# Patient Record
Sex: FEMALE | Race: BLACK OR AFRICAN AMERICAN | NOT HISPANIC OR LATINO | URBAN - METROPOLITAN AREA
[De-identification: names, ages, dates, MRNs, and addresses within clinical notes are randomized per-mention and may not be internally consistent; named-entity substitution may affect disease eponyms.]

---

## 2020-06-19 ENCOUNTER — INPATIENT HOSPITAL (OUTPATIENT)
Dept: URBAN - METROPOLITAN AREA HOSPITAL 93 | Facility: HOSPITAL | Age: 85
End: 2020-06-19
Payer: COMMERCIAL

## 2020-06-19 DIAGNOSIS — D62 ACUTE POSTHEMORRHAGIC ANEMIA: ICD-10-CM

## 2020-06-19 DIAGNOSIS — R41.81 AGE-RELATED COGNITIVE DECLINE: ICD-10-CM

## 2020-06-19 DIAGNOSIS — K92.1 MELENA: ICD-10-CM

## 2020-06-19 DIAGNOSIS — Z79.01 LONG TERM (CURRENT) USE OF ANTICOAGULANTS: ICD-10-CM

## 2020-06-19 DIAGNOSIS — R93.5 ABNORMAL FINDINGS ON DIAGNOSTIC IMAGING OF OTHER ABDOMINAL R: ICD-10-CM

## 2020-06-19 PROCEDURE — 99223 1ST HOSP IP/OBS HIGH 75: CPT | Performed by: INTERNAL MEDICINE

## 2020-06-20 ENCOUNTER — INPATIENT HOSPITAL (OUTPATIENT)
Dept: URBAN - METROPOLITAN AREA HOSPITAL 93 | Facility: HOSPITAL | Age: 85
End: 2020-06-20
Payer: COMMERCIAL

## 2020-06-20 DIAGNOSIS — Z79.01 LONG TERM (CURRENT) USE OF ANTICOAGULANTS: ICD-10-CM

## 2020-06-20 DIAGNOSIS — K92.1 MELENA: ICD-10-CM

## 2020-06-20 DIAGNOSIS — D62 ACUTE POSTHEMORRHAGIC ANEMIA: ICD-10-CM

## 2020-06-20 DIAGNOSIS — R93.5 ABNORMAL FINDINGS ON DIAGNOSTIC IMAGING OF OTHER ABDOMINAL R: ICD-10-CM

## 2020-06-20 DIAGNOSIS — K25.4 CHRONIC OR UNSPECIFIED GASTRIC ULCER WITH HEMORRHAGE: ICD-10-CM

## 2020-06-20 DIAGNOSIS — R41.81 AGE-RELATED COGNITIVE DECLINE: ICD-10-CM

## 2020-06-20 PROCEDURE — 99231 SBSQ HOSP IP/OBS SF/LOW 25: CPT | Mod: 25 | Performed by: INTERNAL MEDICINE

## 2020-06-20 PROCEDURE — 43255 EGD CONTROL BLEEDING ANY: CPT | Mod: 59 | Performed by: INTERNAL MEDICINE

## 2020-06-20 PROCEDURE — 43239 EGD BIOPSY SINGLE/MULTIPLE: CPT | Performed by: INTERNAL MEDICINE

## 2020-06-21 ENCOUNTER — INPATIENT HOSPITAL (OUTPATIENT)
Dept: URBAN - METROPOLITAN AREA HOSPITAL 93 | Facility: HOSPITAL | Age: 85
End: 2020-06-21
Payer: COMMERCIAL

## 2020-06-21 DIAGNOSIS — Z79.01 LONG TERM (CURRENT) USE OF ANTICOAGULANTS: ICD-10-CM

## 2020-06-21 DIAGNOSIS — K92.1 MELENA: ICD-10-CM

## 2020-06-21 DIAGNOSIS — K25.4 CHRONIC OR UNSPECIFIED GASTRIC ULCER WITH HEMORRHAGE: ICD-10-CM

## 2020-06-21 DIAGNOSIS — R41.81 AGE-RELATED COGNITIVE DECLINE: ICD-10-CM

## 2020-06-21 DIAGNOSIS — D62 ACUTE POSTHEMORRHAGIC ANEMIA: ICD-10-CM

## 2020-06-21 PROCEDURE — 99231 SBSQ HOSP IP/OBS SF/LOW 25: CPT | Performed by: INTERNAL MEDICINE

## 2020-08-03 ENCOUNTER — OFFICE (OUTPATIENT)
Dept: URBAN - METROPOLITAN AREA CLINIC 10 | Facility: CLINIC | Age: 85
End: 2020-08-03
Payer: COMMERCIAL

## 2020-08-03 VITALS
DIASTOLIC BLOOD PRESSURE: 73 MMHG | HEART RATE: 77 BPM | OXYGEN SATURATION: 98 % | HEIGHT: 63 IN | SYSTOLIC BLOOD PRESSURE: 132 MMHG | WEIGHT: 168 LBS

## 2020-08-03 DIAGNOSIS — Z79.01 LONG TERM (CURRENT) USE OF ANTICOAGULANTS: ICD-10-CM

## 2020-08-03 DIAGNOSIS — R14.0 ABDOMINAL DISTENSION (GASEOUS): ICD-10-CM

## 2020-08-03 DIAGNOSIS — D50.9 IRON DEFICIENCY ANEMIA, UNSPECIFIED: ICD-10-CM

## 2020-08-03 LAB
CBC, PLATELET, NO DIFFERENTIAL: HEMATOCRIT: 31.9 % — LOW (ref 34–46.6)
CBC, PLATELET, NO DIFFERENTIAL: HEMOGLOBIN: 9.1 G/DL — LOW (ref 11.1–15.9)
CBC, PLATELET, NO DIFFERENTIAL: MCH: 23.2 PG — LOW (ref 26.6–33)
CBC, PLATELET, NO DIFFERENTIAL: MCHC: 28.5 G/DL — LOW (ref 31.5–35.7)
CBC, PLATELET, NO DIFFERENTIAL: MCV: 81 FL (ref 79–97)
CBC, PLATELET, NO DIFFERENTIAL: NRBC: (no result)
CBC, PLATELET, NO DIFFERENTIAL: PLATELETS: 216 X10E3/UL (ref 150–450)
CBC, PLATELET, NO DIFFERENTIAL: RBC: 3.92 X10E6/UL (ref 3.77–5.28)
CBC, PLATELET, NO DIFFERENTIAL: RDW: 21.1 % — HIGH (ref 11.7–15.4)
CBC, PLATELET, NO DIFFERENTIAL: WBC: 6.4 X10E3/UL (ref 3.4–10.8)

## 2020-08-03 PROCEDURE — 99213 OFFICE O/P EST LOW 20 MIN: CPT | Performed by: PHYSICIAN ASSISTANT

## 2020-08-03 NOTE — SERVICEHPINOTES
Ms. Roldan is 89 years old. She is her today in referral from her PCP for PAUL. She was recently in the hospital after having some melena and was found to have bleeding linear ulceration in the antrum of the stomach which was repaired as notes below. Hospital labs vs. recent labs with PCP are also below.  As of late, she has not noticed any melena but has noticed that her stools are a darker brown color and recently started p.o. iron.  Denies any bright red blood per rectum. She denies any nausea or vomiting. She denies any overt abdominal pain. She does note some discomfort in the upper abdomen and some bloating type symptom has been going on for over a month.  Nothing makes better or worse.  Nothing tried.  Appetite is normal. She is on warfarin.Labs from PCP on 07/07/2020:BRWBC 6.9/hemoglobin 8.2/hematocrit 27.5/MCV 80.6/platelets 263BRIron 34/TIBC 335/percent saturation 10/ferritin [id number] 1339/folate 14.6June 19-20 2020 while in hospital:BRHemoglobin 6.2/hematocrit 20.6/MCV 77.4/platelets 165BRINR 3.5EGD-small hiatal hernia and esophagus, approximate 2 cm of salmon-colored mucosa, polypoid lesion with broad base in antrum that appeared to be hyperplastic polyp versus prominent pyloric fold with linear ulceration that was bleeding. Lesion was injected with 2 cc of epinephrine followed by gold probe cautery. The defect was then closed with 3 endo clips with no further bleeding biopsy was taken which showed reactive gastropathy negative for H pylori.

## 2021-01-13 ENCOUNTER — OFFICE (OUTPATIENT)
Dept: URBAN - METROPOLITAN AREA CLINIC 10 | Facility: CLINIC | Age: 86
End: 2021-01-13
Payer: COMMERCIAL

## 2021-01-13 VITALS
DIASTOLIC BLOOD PRESSURE: 53 MMHG | WEIGHT: 160 LBS | HEIGHT: 63 IN | SYSTOLIC BLOOD PRESSURE: 109 MMHG | HEART RATE: 81 BPM

## 2021-01-13 DIAGNOSIS — D50.9 IRON DEFICIENCY ANEMIA, UNSPECIFIED: ICD-10-CM

## 2021-01-13 DIAGNOSIS — K43.9 VENTRAL HERNIA WITHOUT OBSTRUCTION OR GANGRENE: ICD-10-CM

## 2021-01-13 DIAGNOSIS — Z79.01 LONG TERM (CURRENT) USE OF ANTICOAGULANTS: ICD-10-CM

## 2021-01-13 DIAGNOSIS — R16.0 HEPATOMEGALY, NOT ELSEWHERE CLASSIFIED: ICD-10-CM

## 2021-01-13 LAB
CBC, PLATELET, NO DIFFERENTIAL: HEMATOCRIT: 31.4 % — LOW (ref 34–46.6)
CBC, PLATELET, NO DIFFERENTIAL: HEMOGLOBIN: 9.5 G/DL — LOW (ref 11.1–15.9)
CBC, PLATELET, NO DIFFERENTIAL: MCH: 24.4 PG — LOW (ref 26.6–33)
CBC, PLATELET, NO DIFFERENTIAL: MCHC: 30.3 G/DL — LOW (ref 31.5–35.7)
CBC, PLATELET, NO DIFFERENTIAL: MCV: 81 FL (ref 79–97)
CBC, PLATELET, NO DIFFERENTIAL: PLATELETS: 180 X10E3/UL (ref 150–450)
CBC, PLATELET, NO DIFFERENTIAL: RBC: 3.89 X10E6/UL (ref 3.77–5.28)
CBC, PLATELET, NO DIFFERENTIAL: RDW: 18.5 % — HIGH (ref 11.7–15.4)
CBC, PLATELET, NO DIFFERENTIAL: WBC: 6.3 X10E3/UL (ref 3.4–10.8)
HBSAG SCREEN: NEGATIVE
HCV ANTIBODY: HEP C VIRUS AB: <0.1 S/CO RATIO
HEP A AB, TOTAL: POSITIVE
HEP B SURFACE AB: HEP B SURFACE AB, QUAL: NON REACTIVE
HEPATIC FUNCTION PANEL (7): ALBUMIN: 4.1 G/DL (ref 3.5–4.6)
HEPATIC FUNCTION PANEL (7): ALKALINE PHOSPHATASE: 147 IU/L — HIGH (ref 39–117)
HEPATIC FUNCTION PANEL (7): ALT (SGPT): 40 IU/L — HIGH (ref 0–32)
HEPATIC FUNCTION PANEL (7): AST (SGOT): 51 IU/L — HIGH (ref 0–40)
HEPATIC FUNCTION PANEL (7): BILIRUBIN, DIRECT: 0.37 MG/DL (ref 0–0.4)
HEPATIC FUNCTION PANEL (7): BILIRUBIN, TOTAL: 1.2 MG/DL (ref 0–1.2)
HEPATIC FUNCTION PANEL (7): PROTEIN, TOTAL: 6.6 G/DL (ref 6–8.5)
PROTHROMBIN TIME (PT): INR: 2.4 — HIGH (ref 0.9–1.2)
PROTHROMBIN TIME (PT): PROTHROMBIN TIME: 24.4 SEC — HIGH (ref 9.1–12)

## 2021-01-13 PROCEDURE — 99214 OFFICE O/P EST MOD 30 MIN: CPT | Performed by: PHYSICIAN ASSISTANT

## 2021-01-13 NOTE — SERVICENOTES
Agree with assessment above.  I was available for real-time consultation but did not evaluate patient. Wonder if hepatomegaly is related to hepatic congestion from CHF verses long-term amiodarone use versus other

## 2021-01-13 NOTE — SERVICEHPINOTES
Tiera Roldan   is a  89   year old   female   who is here today for a follow-up visit. She was last seen here on  8/3/2020   for a  Follow Up  .    She was last seen for bloating sx and PAUL. She is here today for follow up. She was referred for hepatomegaly noted on USG in Sept of last year with PCP. PCP also had record of CT from 2019 that was sent over noting some hepatomegaly that was reviewed. Has hx of CHF and is on Amiodorone. She  is here with her daughter today.  She actually states abdominal distention is swelling has gotten much better.  She was also having lower extremity swelling.  Looks like she is now on furosemide.   Edema has improved.  She states she has actually been feeling quite well it overall doing well but is here today to discuss a lump she has noticed in her abdomen that is protruding which she stands out worse is forward.  His not causing her any pain.  She is having normal bowel movements with darker color likely due to oral iron no  tarry stools. Weight is down 8 pounds today vs last visit. Review of records recently sent from PCP:BR09/18/2020: Abdominal ultrasound-hepatomegaly measuring 24 cm. No focal abnormality. Mild to moderate ascites in the abdomen and pelvis. Increased renal cortical echotexture both kidneys compatible with chronic renal disease. Soft tissue thickening and edema involving the umbilicus and surrounding soft tissues of the anterior abdomen wall.08/16/2019: CT abdomen and pelvis without contrast-findings of fluid overload including bilateral pleural effusions, mild ascites, and anasarca. Positive for obstructive uropathy on the right including a 9 mm obstructing calculus within the proximal right ureter with mild to moderate right hydronephrosis. Hepatomegaly.8/3/20: WBC 6.4/Hgb 9.1/Hct 31.9/MCV 81/PLT 216Labs from PCP on 07/07/2020:BRWBC 6.9/hemoglobin 8.2/hematocrit 27.5/MCV 80.6/platelets 263BRIron 34/TIBC 335/percent saturation 10/ferritin [id number] 1339/folate 14.6June 19-20 2020 while in hospital:BRHemoglobin 6.2/hematocrit 20.6/MCV 77.4/platelets 165BRINR 3.5EGD-small hiatal hernia and esophagus, approximate 2 cm of salmon-colored mucosa, polypoid lesion with broad base in antrum that appeared to be hyperplastic polyp versus prominent pyloric fold with linear ulceration that was bleeding. Lesion was injected with 2 cc of epinephrine followed by gold probe cautery. The defect was then closed with 3 endo clips with no further bleeding biopsy was taken which showed reactive gastropathy negative for H pylori.

## 2022-05-24 ENCOUNTER — OFFICE (OUTPATIENT)
Dept: URBAN - METROPOLITAN AREA CLINIC 10 | Facility: CLINIC | Age: 87
End: 2022-05-24

## 2022-05-24 VITALS
HEART RATE: 80 BPM | OXYGEN SATURATION: 94 % | DIASTOLIC BLOOD PRESSURE: 70 MMHG | WEIGHT: 168 LBS | SYSTOLIC BLOOD PRESSURE: 141 MMHG | HEIGHT: 63 IN

## 2022-05-24 DIAGNOSIS — K43.9 VENTRAL HERNIA WITHOUT OBSTRUCTION OR GANGRENE: ICD-10-CM

## 2022-05-24 DIAGNOSIS — K74.60 UNSPECIFIED CIRRHOSIS OF LIVER: ICD-10-CM

## 2022-05-24 DIAGNOSIS — I50.30 UNSPECIFIED DIASTOLIC (CONGESTIVE) HEART FAILURE: ICD-10-CM

## 2022-05-24 LAB
CBC, PLATELET, NO DIFFERENTIAL: HEMATOCRIT: 30.6 % — LOW (ref 34–46.6)
CBC, PLATELET, NO DIFFERENTIAL: HEMOGLOBIN: 9.2 G/DL — LOW (ref 11.1–15.9)
CBC, PLATELET, NO DIFFERENTIAL: MCH: 23.4 PG — LOW (ref 26.6–33)
CBC, PLATELET, NO DIFFERENTIAL: MCHC: 30.1 G/DL — LOW (ref 31.5–35.7)
CBC, PLATELET, NO DIFFERENTIAL: MCV: 78 FL — LOW (ref 79–97)
CBC, PLATELET, NO DIFFERENTIAL: PLATELETS: 174 X10E3/UL (ref 150–450)
CBC, PLATELET, NO DIFFERENTIAL: RBC: 3.93 X10E6/UL (ref 3.77–5.28)
CBC, PLATELET, NO DIFFERENTIAL: RDW: 17 % — HIGH (ref 11.7–15.4)
CBC, PLATELET, NO DIFFERENTIAL: WBC: 5 X10E3/UL (ref 3.4–10.8)
COMP. METABOLIC PANEL (14): A/G RATIO: 1.6 (ref 1.2–2.2)
COMP. METABOLIC PANEL (14): ALBUMIN: 4.2 G/DL (ref 3.5–4.6)
COMP. METABOLIC PANEL (14): ALKALINE PHOSPHATASE: 128 IU/L — HIGH (ref 44–121)
COMP. METABOLIC PANEL (14): ALT (SGPT): 13 IU/L (ref 0–32)
COMP. METABOLIC PANEL (14): AST (SGOT): 22 IU/L (ref 0–40)
COMP. METABOLIC PANEL (14): BILIRUBIN, TOTAL: 1 MG/DL (ref 0–1.2)
COMP. METABOLIC PANEL (14): BUN/CREATININE RATIO: 22 (ref 12–28)
COMP. METABOLIC PANEL (14): BUN: 32 MG/DL (ref 10–36)
COMP. METABOLIC PANEL (14): CALCIUM: 9.3 MG/DL (ref 8.7–10.3)
COMP. METABOLIC PANEL (14): CARBON DIOXIDE, TOTAL: 29 MMOL/L (ref 20–29)
COMP. METABOLIC PANEL (14): CHLORIDE: 102 MMOL/L (ref 96–106)
COMP. METABOLIC PANEL (14): CREATININE: 1.44 MG/DL — HIGH (ref 0.57–1)
COMP. METABOLIC PANEL (14): EGFR: 33 ML/MIN/1.73 — LOW (ref 59–?)
COMP. METABOLIC PANEL (14): GLOBULIN, TOTAL: 2.6 G/DL (ref 1.5–4.5)
COMP. METABOLIC PANEL (14): GLUCOSE: 78 MG/DL (ref 65–99)
COMP. METABOLIC PANEL (14): POTASSIUM: 4.1 MMOL/L (ref 3.5–5.2)
COMP. METABOLIC PANEL (14): PROTEIN, TOTAL: 6.8 G/DL (ref 6–8.5)
COMP. METABOLIC PANEL (14): SODIUM: 147 MMOL/L — HIGH (ref 134–144)
PROTHROMBIN TIME (PT): INR: 2.4 — HIGH (ref 0.9–1.2)
PROTHROMBIN TIME (PT): PROTHROMBIN TIME: 24.8 SEC — HIGH (ref 9.1–12)

## 2022-05-24 PROCEDURE — 99213 OFFICE O/P EST LOW 20 MIN: CPT | Performed by: PHYSICIAN ASSISTANT
